# Patient Record
Sex: FEMALE | Race: BLACK OR AFRICAN AMERICAN | NOT HISPANIC OR LATINO | Employment: FULL TIME | URBAN - METROPOLITAN AREA
[De-identification: names, ages, dates, MRNs, and addresses within clinical notes are randomized per-mention and may not be internally consistent; named-entity substitution may affect disease eponyms.]

---

## 2020-11-22 ENCOUNTER — APPOINTMENT (OUTPATIENT)
Dept: ULTRASOUND IMAGING | Facility: HOSPITAL | Age: 40
End: 2020-11-22

## 2020-11-22 ENCOUNTER — HOSPITAL ENCOUNTER (EMERGENCY)
Facility: HOSPITAL | Age: 40
Discharge: HOME | End: 2020-11-22
Attending: EMERGENCY MEDICINE | Admitting: EMERGENCY MEDICINE

## 2020-11-22 VITALS
OXYGEN SATURATION: 99 % | DIASTOLIC BLOOD PRESSURE: 69 MMHG | HEIGHT: 61 IN | TEMPERATURE: 98.1 F | HEART RATE: 73 BPM | RESPIRATION RATE: 18 BRPM | SYSTOLIC BLOOD PRESSURE: 103 MMHG

## 2020-11-22 DIAGNOSIS — O20.0 THREATENED MISCARRIAGE IN EARLY PREGNANCY: Primary | ICD-10-CM

## 2020-11-22 LAB
ABO GROUP BLD: NORMAL
ALBUMIN SERPL-MCNC: 4.1 G/DL (ref 3.5–5.2)
ALBUMIN/GLOB SERPL: 0.9 G/DL
ALP SERPL-CCNC: 51 U/L (ref 39–117)
ALT SERPL W P-5'-P-CCNC: 30 U/L (ref 1–33)
ANION GAP SERPL CALCULATED.3IONS-SCNC: 9.5 MMOL/L (ref 5–15)
AST SERPL-CCNC: 22 U/L (ref 1–32)
BACTERIA UR QL AUTO: ABNORMAL /HPF
BASOPHILS # BLD AUTO: 0.04 10*3/MM3 (ref 0–0.2)
BASOPHILS NFR BLD AUTO: 0.7 % (ref 0–1.5)
BILIRUB SERPL-MCNC: 0.7 MG/DL (ref 0–1.2)
BILIRUB UR QL STRIP: NEGATIVE
BLD GP AB SCN SERPL QL: NEGATIVE
BUN SERPL-MCNC: 9 MG/DL (ref 6–20)
BUN/CREAT SERPL: 13.6 (ref 7–25)
CALCIUM SPEC-SCNC: 9.5 MG/DL (ref 8.6–10.5)
CHLORIDE SERPL-SCNC: 106 MMOL/L (ref 98–107)
CLARITY UR: CLEAR
CLUE CELLS SPEC QL WET PREP: NORMAL
CO2 SERPL-SCNC: 22.5 MMOL/L (ref 22–29)
COLOR UR: YELLOW
CREAT SERPL-MCNC: 0.66 MG/DL (ref 0.57–1)
DEPRECATED RDW RBC AUTO: 39.4 FL (ref 37–54)
EOSINOPHIL # BLD AUTO: 0.24 10*3/MM3 (ref 0–0.4)
EOSINOPHIL NFR BLD AUTO: 4 % (ref 0.3–6.2)
ERYTHROCYTE [DISTWIDTH] IN BLOOD BY AUTOMATED COUNT: 12.2 % (ref 12.3–15.4)
GFR SERPL CREATININE-BSD FRML MDRD: 120 ML/MIN/1.73
GLOBULIN UR ELPH-MCNC: 4.5 GM/DL
GLUCOSE SERPL-MCNC: 102 MG/DL (ref 65–99)
GLUCOSE UR STRIP-MCNC: NEGATIVE MG/DL
HCG INTACT+B SERPL-ACNC: 1334 MIU/ML
HCT VFR BLD AUTO: 37.4 % (ref 34–46.6)
HGB BLD-MCNC: 12.1 G/DL (ref 12–15.9)
HGB UR QL STRIP.AUTO: ABNORMAL
HOLD SPECIMEN: NORMAL
HOLD SPECIMEN: NORMAL
HYALINE CASTS UR QL AUTO: ABNORMAL /LPF
HYDATID CYST SPEC WET PREP: NORMAL
IMM GRANULOCYTES # BLD AUTO: 0.01 10*3/MM3 (ref 0–0.05)
IMM GRANULOCYTES NFR BLD AUTO: 0.2 % (ref 0–0.5)
KETONES UR QL STRIP: NEGATIVE
LEUKOCYTE ESTERASE UR QL STRIP.AUTO: NEGATIVE
LYMPHOCYTES # BLD AUTO: 2.81 10*3/MM3 (ref 0.7–3.1)
LYMPHOCYTES NFR BLD AUTO: 46.9 % (ref 19.6–45.3)
MCH RBC QN AUTO: 28.5 PG (ref 26.6–33)
MCHC RBC AUTO-ENTMCNC: 32.4 G/DL (ref 31.5–35.7)
MCV RBC AUTO: 88.2 FL (ref 79–97)
MONOCYTES # BLD AUTO: 0.59 10*3/MM3 (ref 0.1–0.9)
MONOCYTES NFR BLD AUTO: 9.8 % (ref 5–12)
NEUTROPHILS NFR BLD AUTO: 2.3 10*3/MM3 (ref 1.7–7)
NEUTROPHILS NFR BLD AUTO: 38.4 % (ref 42.7–76)
NITRITE UR QL STRIP: NEGATIVE
NRBC BLD AUTO-RTO: 0 /100 WBC (ref 0–0.2)
PH UR STRIP.AUTO: 6.5 [PH] (ref 5–8)
PLATELET # BLD AUTO: 243 10*3/MM3 (ref 140–450)
PMV BLD AUTO: 10.9 FL (ref 6–12)
POTASSIUM SERPL-SCNC: 4.3 MMOL/L (ref 3.5–5.2)
PROT SERPL-MCNC: 8.6 G/DL (ref 6–8.5)
PROT UR QL STRIP: NEGATIVE
RBC # BLD AUTO: 4.24 10*6/MM3 (ref 3.77–5.28)
RBC # UR: ABNORMAL /HPF
REF LAB TEST METHOD: ABNORMAL
RH BLD: NEGATIVE
SODIUM SERPL-SCNC: 138 MMOL/L (ref 136–145)
SP GR UR STRIP: 1.02 (ref 1–1.03)
SQUAMOUS #/AREA URNS HPF: ABNORMAL /HPF
T VAGINALIS SPEC QL WET PREP: NORMAL
T&S EXPIRATION DATE: NORMAL
UROBILINOGEN UR QL STRIP: ABNORMAL
WBC # BLD AUTO: 5.99 10*3/MM3 (ref 3.4–10.8)
WBC SPEC QL WET PREP: NORMAL
WBC UR QL AUTO: ABNORMAL /HPF
WHOLE BLOOD HOLD SPECIMEN: NORMAL
WHOLE BLOOD HOLD SPECIMEN: NORMAL
YEAST GENITAL QL WET PREP: NORMAL

## 2020-11-22 PROCEDURE — 86850 RBC ANTIBODY SCREEN: CPT | Performed by: EMERGENCY MEDICINE

## 2020-11-22 PROCEDURE — 87210 SMEAR WET MOUNT SALINE/INK: CPT | Performed by: EMERGENCY MEDICINE

## 2020-11-22 PROCEDURE — 96372 THER/PROPH/DIAG INJ SC/IM: CPT

## 2020-11-22 PROCEDURE — 81001 URINALYSIS AUTO W/SCOPE: CPT | Performed by: EMERGENCY MEDICINE

## 2020-11-22 PROCEDURE — 80053 COMPREHEN METABOLIC PANEL: CPT | Performed by: EMERGENCY MEDICINE

## 2020-11-22 PROCEDURE — 86900 BLOOD TYPING SEROLOGIC ABO: CPT | Performed by: EMERGENCY MEDICINE

## 2020-11-22 PROCEDURE — 99284 EMERGENCY DEPT VISIT MOD MDM: CPT

## 2020-11-22 PROCEDURE — 87491 CHLMYD TRACH DNA AMP PROBE: CPT | Performed by: EMERGENCY MEDICINE

## 2020-11-22 PROCEDURE — 99285 EMERGENCY DEPT VISIT HI MDM: CPT

## 2020-11-22 PROCEDURE — 84702 CHORIONIC GONADOTROPIN TEST: CPT

## 2020-11-22 PROCEDURE — 76815 OB US LIMITED FETUS(S): CPT

## 2020-11-22 PROCEDURE — 87591 N.GONORRHOEAE DNA AMP PROB: CPT | Performed by: EMERGENCY MEDICINE

## 2020-11-22 PROCEDURE — 86901 BLOOD TYPING SEROLOGIC RH(D): CPT | Performed by: EMERGENCY MEDICINE

## 2020-11-22 PROCEDURE — 76817 TRANSVAGINAL US OBSTETRIC: CPT

## 2020-11-22 PROCEDURE — 85025 COMPLETE CBC W/AUTO DIFF WBC: CPT

## 2020-11-22 PROCEDURE — P9612 CATHETERIZE FOR URINE SPEC: HCPCS

## 2020-11-22 PROCEDURE — 25010000002 RHO D IMMUNE GLOBULIN 1500 UNIT/2ML SOLUTION PREFILLED SYRINGE: Performed by: EMERGENCY MEDICINE

## 2020-11-22 RX ORDER — PNV NO.95/FERROUS FUM/FOLIC AC 28MG-0.8MG
1 TABLET ORAL DAILY
Qty: 30 TABLET | Refills: 0 | Status: SHIPPED | OUTPATIENT
Start: 2020-11-22

## 2020-11-22 RX ORDER — SODIUM CHLORIDE 0.9 % (FLUSH) 0.9 %
10 SYRINGE (ML) INJECTION AS NEEDED
Status: DISCONTINUED | OUTPATIENT
Start: 2020-11-22 | End: 2020-11-22 | Stop reason: HOSPADM

## 2020-11-22 RX ADMIN — HUMAN RHO(D) IMMUNE GLOBULIN 300 MCG: 1500 SOLUTION INTRAMUSCULAR; INTRAVENOUS at 11:28

## 2020-11-22 NOTE — ED NOTES
Pt reports she just moved here from [place] and hasn't established OB Care. Pt reports she is 14wks pregnant and started spotting about a week ago. Pt reports she passed a clot yesterday and today started bleeding through 2 pads an hour with intermittent and cramping.     Pt was wearing a mask during assessment.  This RN wore appropriate PPE       Rochelle Jones RN  11/22/20 0753

## 2020-11-22 NOTE — DISCHARGE INSTRUCTIONS
You are advised to follow closely with Dr. De Dios or gynecologist of your choice in 2-3 days for recheck, pelvic culture results, final results of lab work and imaging testing, and further testing/treatment as needed.    Drink plenty of fluids-at least 10 glasses of water daily.  Take prenatal vitamins every day.  Pelvic rest until follow with Dr. De Diso or gynecologist of your choice.        Please return to the emergency department immediately with chest pain different than usual for you, shortness of air, abdominal pain, persistent vomiting/fever, blood in emesis or stool, lightheadedness/fainting, problems with speech, one sided weakness/numbness, new incontinence, problems with vision, bleeding greater than 1 pad per hour, pain worse than bad menstrual cramping, or for worsening of symptoms or other concerns.

## 2020-11-22 NOTE — ED PROVIDER NOTES
EMERGENCY DEPARTMENT ENCOUNTER    CHIEF COMPLAINT  Chief Complaint: Vaginal bleeding  History given by: Patient  History limited by: Nothing  Room Number: 40/40  PMD: Provider, No Known  Patient reports she has an appointment with a gynecologist 12/29/2020 but is unable to state who that appointment is with    HPI:  Pt is a 40 y.o. female who reports her LNMP was 8/15/2020 and that she has taken a positive home pregnancy test presents complaining of vaginal bleeding for 1 week.  Patient reports she is G4, P3 Ab0 at 14-1/7 by her LNMP with vaginal bleeding with mild suprapubic cramping similar to mild menstrual cramps without fever, nausea/vomiting, vaginal discharge, dysuria, chest pain, shortness of air, cough.  Patient reports she is blood 4 pads in the past 24 hours.  Patient reports she is non-smoker denies significant medical history or complications with her previous pregnancies    Duration: 1 week  Associated Symptoms: Mild suprapubic cramping  Aggravating Factors: Nothing  Alleviating Factors: Nothing  Treatment before arrival: None    PAST MEDICAL HISTORY  Active Ambulatory Problems     Diagnosis Date Noted   • No Active Ambulatory Problems     Resolved Ambulatory Problems     Diagnosis Date Noted   • No Resolved Ambulatory Problems     No Additional Past Medical History       PAST SURGICAL HISTORY  History reviewed. No pertinent surgical history.    FAMILY HISTORY  History reviewed. No pertinent family history.    SOCIAL HISTORY  Social History     Socioeconomic History   • Marital status:      Spouse name: Not on file   • Number of children: Not on file   • Years of education: Not on file   • Highest education level: Not on file   Tobacco Use   • Smoking status: Never Smoker   Substance and Sexual Activity   • Alcohol use: Not Currently   • Drug use: Never   • Sexual activity: Defer       ALLERGIES  Patient has no known allergies.    REVIEW OF SYSTEMS  Review of Systems   Constitutional: Negative  for chills and fever.   HENT: Negative for rhinorrhea and sore throat.    Eyes: Negative for visual disturbance.   Respiratory: Negative for cough and shortness of breath.    Cardiovascular: Negative for chest pain, palpitations and leg swelling.   Gastrointestinal: Negative for abdominal pain, diarrhea and vomiting.   Endocrine: Negative.    Genitourinary: Positive for pelvic pain ( Mild suprapubic cramping similar to mild menstrual cramps) and vaginal bleeding ( 1 week). Negative for decreased urine volume, dysuria, frequency, vaginal discharge and vaginal pain.   Musculoskeletal: Negative for neck pain.   Skin: Negative for rash.   Neurological: Negative for syncope and headaches.   Psychiatric/Behavioral: Negative.    All other systems reviewed and are negative.      PHYSICAL EXAM  ED Triage Vitals   Temp Heart Rate Resp BP SpO2   11/22/20 0730 11/22/20 0730 11/22/20 0730 11/22/20 0741 11/22/20 0730   98.1 °F (36.7 °C) 92 18 114/83 99 %      Temp src Heart Rate Source Patient Position BP Location FiO2 (%)   11/22/20 0730 -- -- -- --   Tympanic           Physical Exam   Constitutional: She is oriented to person, place, and time.  Non-toxic appearance. She appears distressed (mild).   HENT:   Head: Normocephalic and atraumatic.   Eyes: EOM are normal.   Neck: Normal range of motion. Neck supple.   Cardiovascular: Normal rate, regular rhythm, normal heart sounds and intact distal pulses.   No murmur heard.  Pulses:       Posterior tibial pulses are 2+ on the right side and 2+ on the left side.   Pulmonary/Chest: Effort normal and breath sounds normal. No respiratory distress.   Abdominal: Soft. Bowel sounds are normal. There is no abdominal tenderness. There is no rebound and no guarding.   Genitourinary:    Vulva and cervix normal.   Uterus is enlarged. Cervix exhibits no motion tenderness and no tenderness. Right adnexum displays no mass and no tenderness. Left adnexum displays no mass and no tenderness.     Genitourinary Comments: Uterus 12 to 15 cm  Cervix closed  Small amount of dark blood in the vaginal vault without clots     Musculoskeletal: Normal range of motion.         General: No edema.   Neurological: She is alert and oriented to person, place, and time.   Skin: Skin is warm and dry.   Psychiatric: Affect normal.   Nursing note and vitals reviewed.      LAB RESULTS  Lab Results (last 24 hours)     Procedure Component Value Units Date/Time    CBC & Differential [[id number]]  (Abnormal) Collected: 11/22/20 0801    Specimen: Blood Updated: 11/22/20 0811    Narrative:      The following orders were created for panel order CBC & Differential.  Procedure                               Abnormality         Status                     ---------                               -----------         ------                     CBC Auto Differential[[id number]]        Abnormal            Final result                 Please view results for these tests on the individual orders.    hCG, Quantitative, Pregnancy [[id number]] Collected: 11/22/20 0801    Specimen: Blood Updated: 11/22/20 0832     HCG Quantitative 1,334.00 mIU/mL     Narrative:      HCG Ranges by Gestational Age    Females - non-pregnant premenopausal   </= 1mIU/mL HCG  Females - postmenopausal               </= 7mIU/mL HCG    3 Weeks         5.4 -      72 mIU/mL  4 Weeks        10.2 -     708 mIU/mL  5 Weeks       217   -   8,245 mIU/mL  6 Weeks       152   -  32,177 mIU/mL  7 Weeks     4,059   - 153,767 mIU/mL  8 Weeks    31,366   - 149,094 mIU/mL  9 Weeks    59,109   - 135,901 mIU/mL  10 Weeks   44,186   - 170,409 mIU/mL  12 Weeks   27,107   - 201,615 mIU/mL  14 Weeks   24,302   -  93,646 mIU/mL  15 Weeks   12,540   -  69,747 mIU/mL  16 Weeks    8,904   -  55,332 mIU/mL  17 Weeks    8,240   -  51,793 mIU/mL  18 Weeks    9,649   -  55,271 mIU/mL    Results may be falsely decreased if patient taking Biotin.      CBC Auto Differential [[id number]]  (Abnormal) Collected:  11/22/20 0801    Specimen: Blood Updated: 11/22/20 0811     WBC 5.99 10*3/mm3      RBC 4.24 10*6/mm3      Hemoglobin 12.1 g/dL      Hematocrit 37.4 %      MCV 88.2 fL      MCH 28.5 pg      MCHC 32.4 g/dL      RDW 12.2 %      RDW-SD 39.4 fl      MPV 10.9 fL      Platelets 243 10*3/mm3      Neutrophil % 38.4 %      Lymphocyte % 46.9 %      Monocyte % 9.8 %      Eosinophil % 4.0 %      Basophil % 0.7 %      Immature Grans % 0.2 %      Neutrophils, Absolute 2.30 10*3/mm3      Lymphocytes, Absolute 2.81 10*3/mm3      Monocytes, Absolute 0.59 10*3/mm3      Eosinophils, Absolute 0.24 10*3/mm3      Basophils, Absolute 0.04 10*3/mm3      Immature Grans, Absolute 0.01 10*3/mm3      nRBC 0.0 /100 WBC     Comprehensive Metabolic Panel [[id number]]  (Abnormal) Collected: 11/22/20 0801    Specimen: Blood Updated: 11/22/20 0833     Glucose 102 mg/dL      BUN 9 mg/dL      Creatinine 0.66 mg/dL      Sodium 138 mmol/L      Potassium 4.3 mmol/L      Comment: Slight hemolysis detected by analyzer. Results may be affected.        Chloride 106 mmol/L      CO2 22.5 mmol/L      Calcium 9.5 mg/dL      Total Protein 8.6 g/dL      Albumin 4.10 g/dL      ALT (SGPT) 30 U/L      AST (SGOT) 22 U/L      Comment: Slight hemolysis detected by analyzer. Results may be affected.        Alkaline Phosphatase 51 U/L      Total Bilirubin 0.7 mg/dL      eGFR  African Amer 120 mL/min/1.73      Globulin 4.5 gm/dL      A/G Ratio 0.9 g/dL      BUN/Creatinine Ratio 13.6     Anion Gap 9.5 mmol/L     Narrative:      GFR Normal >60  Chronic Kidney Disease <60  Kidney Failure <15      Urinalysis With Microscopic If Indicated (No Culture) - Urine, Catheter [[id number]]  (Abnormal) Collected: 11/22/20 0822    Specimen: Urine, Catheter Updated: 11/22/20 0847     Color, UA Yellow     Appearance, UA Clear     pH, UA 6.5     Specific Gravity, UA 1.018     Glucose, UA Negative     Ketones, UA Negative     Bilirubin, UA Negative     Blood, UA Trace     Protein, UA Negative      Leuk Esterase, UA Negative     Nitrite, UA Negative     Urobilinogen, UA 0.2 E.U./dL    Chlamydia trachomatis, Neisseria gonorrhoeae, PCR - Swab, Cervix [[id number]] Collected: 11/22/20 0822    Specimen: Swab from Cervix Updated: 11/22/20 0831    Wet Prep, Genital - Swab, Vagina [[id number]]  (Normal) Collected: 11/22/20 0822    Specimen: Swab from Vagina Updated: 11/22/20 0846     YEAST No yeast seen     HYPHAL ELEMENTS No Hyphal elements seen     WBC'S No WBC's seen     Clue Cells, Wet Prep No Clue cells seen     Trichomonas, Wet Prep No Trichomonas seen    Urinalysis, Microscopic Only - Urine, Catheter [[id number]]  (Abnormal) Collected: 11/22/20 0822    Specimen: Urine, Catheter Updated: 11/22/20 0849     RBC, UA 3-5 /HPF      WBC, UA 0-2 /HPF      Bacteria, UA None Seen /HPF      Squamous Epithelial Cells, UA 0-2 /HPF      Hyaline Casts, UA 3-6 /LPF      Methodology Automated Microscopy          I ordered the above labs and reviewed the results    RADIOLOGY  US Ob Limited 1 + Fetuses   Final Result       Findings suspicious for fetal demise, as described, close follow-up   recommended.       Discussed by telephone with Dr. Aguilera at time of interpretation, 0959,   11/22/2020.               This report was finalized on 11/22/2020 10:01 AM by Dr. Doug Pinzon M.D.          US Ob Transvaginal    (Results Pending)      Ultrasound with gestational sac size mismatch with size of fetal pole that dates to 7-4/7 by measurements without signs of cardiac activity.  Ovaries unremarkable no free fluid in the pelvis      I ordered the above noted radiological studies. Interpreted by radiologist. Viewed by me in PACS.       PROCEDURES  Procedures      PROGRESS AND CONSULTS  ED Course as of Nov 22 1731   Sun Nov 22, 2020   1108 Discussed with Dr. Pinzon, radiology who reports mismatch and gestational sac versus fetal pole without cardiac activity highly suspicious for nonviable pregnancy    [TO]      ED Course User  Index  [TO] Verónica Aguilera MD       Patient voices understanding of concern for nonviable pregnancy and suspected inevitable miscarriage with recommendations for close follow-up with gynecologist for recheck, further testing and treatment as needed.  She voices understanding of need to return to the emergency department with bleeding greater than 1 pad per hour, pain greater than bad menstrual cramps, fever, vomiting, lightheadedness or fainting or other concerns.    MEDICAL DECISION MAKING  Results were reviewed/discussed with the patient and they were also made aware of online access. Pt also made aware that some labs, such as cultures, will not be resulted during ER visit and follow up with PMD is necessary.       MDM       DIAGNOSIS  Final diagnoses:   Threatened miscarriage in early pregnancy       DISPOSITION  DISCHARGE    Patient discharged in stable condition.    Reviewed implications of results, diagnosis, meds, responsibility to follow up, warning signs and symptoms of possible worsening, potential complications and reasons to return to ER.    Patient/Family voiced understanding of above instructions.    Discussed plan for discharge, as there is no emergent indication for admission. Patient referred to primary care provider for BP management due to today's BP. Pt/family is agreeable and understands need for follow up and repeat testing.  Pt is aware that discharge does not mean that nothing is wrong but it indicates no emergency is present that requires admission and they must continue care with follow-up as given below or physician of their choice.     FOLLOW-UP  Karlee De Dios MD  [address] [facility] [id number] [phone]    Schedule an appointment as soon as possible for a visit in 3 days  EVEN IF WELL         Medication List      New Prescriptions    prenatal vitamin 28-0.8 28-0.8 MG tablet tablet  Take 1 tablet by mouth Daily.           Where to Get Your Medications      You can get  these medications from any pharmacy    Bring a paper prescription for each of these medications  · prenatal vitamin 28-0.8 28-0.8 MG tablet tablet           Latest Documented Vital Signs:  As of 17:31 EST  BP- 103/69 HR- 73 Temp- 98.1 °F (36.7 °C) (Tympanic) O2 sat- 99%    --  Patient was wearing facemask when I entered the room and throughout our encounter. Full protective equipment was worn throughout this patient encounter including a face mask, eye protection and gloves. Hand hygiene was performed before donning protective equipment and after removal when leaving the room.      Verónica Aguilera MD  11/22/20 7164

## 2020-11-25 ENCOUNTER — TELEPHONE (OUTPATIENT)
Dept: SOCIAL WORK | Facility: HOSPITAL | Age: 40
End: 2020-11-25

## 2020-11-25 LAB
C TRACH RRNA SPEC QL NAA+PROBE: NEGATIVE
N GONORRHOEA RRNA SPEC QL NAA+PROBE: NEGATIVE

## 2020-11-25 NOTE — TELEPHONE ENCOUNTER
ER F/U phone call: Pt stated that she f/u with the gyn that was recommended. She is doing better today, due to the circumstances. No further questions or concerns voiced at this time. Shandra Fry RN

## 2020-12-02 ENCOUNTER — TELEPHONE (OUTPATIENT)
Dept: OBSTETRICS AND GYNECOLOGY | Age: 40
End: 2020-12-02

## 2020-12-02 NOTE — TELEPHONE ENCOUNTER
I am ccing Dr De Dios on this email for her input as well.  I'm happy to have pt seen tomorrow with u/s to confirm her suspicions. I have reviewed hospital notes as well.  Pt is rh negative and I do see that Rhogam was administered.  Defer to Dr De Dios recommendation as well

## 2020-12-02 NOTE — TELEPHONE ENCOUNTER
Pt called was seen in ER on 11/22/2020 for bleeding, treated for miscarriage.  Pt feels as if she has passed the pregnancy at home.  Symptoms have started to ease.  Please advise on scheduling?  Pt was instructed to see Dr. De Dios.

## 2020-12-03 ENCOUNTER — OFFICE VISIT (OUTPATIENT)
Dept: OBSTETRICS AND GYNECOLOGY | Age: 40
End: 2020-12-03

## 2020-12-03 ENCOUNTER — PROCEDURE VISIT (OUTPATIENT)
Dept: OBSTETRICS AND GYNECOLOGY | Age: 40
End: 2020-12-03

## 2020-12-03 VITALS
SYSTOLIC BLOOD PRESSURE: 122 MMHG | BODY MASS INDEX: 28.13 KG/M2 | WEIGHT: 149 LBS | HEIGHT: 61 IN | DIASTOLIC BLOOD PRESSURE: 72 MMHG

## 2020-12-03 DIAGNOSIS — O02.1 MISSED ABORTION: Primary | ICD-10-CM

## 2020-12-03 DIAGNOSIS — O03.4 INCOMPLETE ABORTION: ICD-10-CM

## 2020-12-03 DIAGNOSIS — Z13.89 SCREENING FOR BLOOD OR PROTEIN IN URINE: Primary | ICD-10-CM

## 2020-12-03 DIAGNOSIS — O20.0 THREATENED ABORTION: ICD-10-CM

## 2020-12-03 LAB
B-HCG UR QL: POSITIVE
BILIRUB BLD-MCNC: ABNORMAL MG/DL
CLARITY, POC: CLEAR
COLOR UR: YELLOW
GLUCOSE UR STRIP-MCNC: NEGATIVE MG/DL
INTERNAL NEGATIVE CONTROL: NEGATIVE
INTERNAL POSITIVE CONTROL: POSITIVE
KETONES UR QL: NEGATIVE
LEUKOCYTE EST, POC: ABNORMAL
Lab: ABNORMAL
NITRITE UR-MCNC: NEGATIVE MG/ML
PH UR: 7 [PH] (ref 5–8)
PROT UR STRIP-MCNC: ABNORMAL MG/DL
RBC # UR STRIP: ABNORMAL /UL
SP GR UR: 1.02 (ref 1–1.03)
UROBILINOGEN UR QL: ABNORMAL

## 2020-12-03 PROCEDURE — 99203 OFFICE O/P NEW LOW 30 MIN: CPT | Performed by: PHYSICIAN ASSISTANT

## 2020-12-03 PROCEDURE — 76817 TRANSVAGINAL US OBSTETRIC: CPT | Performed by: OBSTETRICS & GYNECOLOGY

## 2020-12-03 PROCEDURE — 81025 URINE PREGNANCY TEST: CPT | Performed by: PHYSICIAN ASSISTANT

## 2020-12-03 NOTE — TELEPHONE ENCOUNTER
Pt will have to be worked in today with PA/NP and she needs to have an ultrasound as well.  Make the appt with an u/s and double book us if need be.  Just make sure she understands there will be a wait.

## 2020-12-03 NOTE — TELEPHONE ENCOUNTER
Reviewed chart. Pt evaluated in ER on 11/22; email reviewed by staff on 12/2/2020. Please schedule f/u visit with NP or PA on 12/3/2020 as MD out of office post-call

## 2020-12-03 NOTE — PROGRESS NOTES
"Subjective     Chief Complaint   Patient presents with   • Gynecologic Exam     follow up from ER miscarriage       Cheyanne Hill is a 40 y.o.  whose LMP is Patient's last menstrual period was 08/15/2020. presents for f/u from ER    Pt is new to our office  Was seen in ER recently for bleeding in early pregnancy  Was supposed to be approx 14 wks along but u/s suggested fetal demise given that no heart beat was noted   She was having heavy bleeding at that time  Advised to f/u with our office  Pt thought she miscarried and did not contact the office until yesterday suspecting she had a complete miscarriage  Of note, she is rh negative and was given rhogam at the hospital    Pt has had 3 other pregnancies. She has 21 yo children and a 3 yo  No issues with previous pregnancy  Denies any significant med history    From [place], in [place] for a year    Will be establishing with Dr De Dios    No Additional Complaints Reported    The following portions of the patient's history were reviewed and updated as appropriate:vital signs, allergies, current medications, past family history, past medical history, past social history, past surgical history and problem list      Review of Systems   Genitourinary:positive for early stage of pregnancy     Objective      /72   Ht 154.9 cm (61\")   Wt 67.6 kg (149 lb)   LMP 08/15/2020   Breastfeeding No   BMI 28.15 kg/m²     Physical Exam    General:   alert, comfortable and moderate distress   Heart: Not performed today   Lungs: Not performed today.   Breast: Not performed today   Neck: na   Abdomen: {Not performed today   CVA: Not performed today   Pelvis: Not performed today   Extremities: Not performed today   Neurologic: negative   Psychiatric: Normal affect, judgement, and mood       Lab Review   Labs: CBC     Imaging   Ultrasound - Pelvic Vaginal CRL measures 1.27 cm c/w 7 wks 3 days, not c/w dates and no FHT noted     Assessment/Plan     ASSESSMENT  1. Screening " for blood or protein in urine    2. Threatened     3. Incomplete         PLAN  1.   Orders Placed This Encounter   Procedures   • POC Pregnancy, Urine   • POC Urinalysis Dipstick, Multipro   • CBC & Differential       2. CBC ordered today. Will plan f/u to discuss POC with Dr De Dios. She has been scheduled at 12 o'clock tomorrow    Follow up: 1 day(s)    ALICE Cool  12/3/2020

## 2020-12-04 LAB
BASOPHILS # BLD AUTO: 0.03 10*3/MM3 (ref 0–0.2)
BASOPHILS NFR BLD AUTO: 0.4 % (ref 0–1.5)
EOSINOPHIL # BLD AUTO: 0.18 10*3/MM3 (ref 0–0.4)
EOSINOPHIL NFR BLD AUTO: 2.6 % (ref 0.3–6.2)
ERYTHROCYTE [DISTWIDTH] IN BLOOD BY AUTOMATED COUNT: 12.3 % (ref 12.3–15.4)
HCT VFR BLD AUTO: 36.8 % (ref 34–46.6)
HGB BLD-MCNC: 11.8 G/DL (ref 12–15.9)
IMM GRANULOCYTES # BLD AUTO: 0.03 10*3/MM3 (ref 0–0.05)
IMM GRANULOCYTES NFR BLD AUTO: 0.4 % (ref 0–0.5)
LYMPHOCYTES # BLD AUTO: 2.91 10*3/MM3 (ref 0.7–3.1)
LYMPHOCYTES NFR BLD AUTO: 42.2 % (ref 19.6–45.3)
MCH RBC QN AUTO: 28.4 PG (ref 26.6–33)
MCHC RBC AUTO-ENTMCNC: 32.1 G/DL (ref 31.5–35.7)
MCV RBC AUTO: 88.7 FL (ref 79–97)
MONOCYTES # BLD AUTO: 0.66 10*3/MM3 (ref 0.1–0.9)
MONOCYTES NFR BLD AUTO: 9.6 % (ref 5–12)
NEUTROPHILS # BLD AUTO: 3.09 10*3/MM3 (ref 1.7–7)
NEUTROPHILS NFR BLD AUTO: 44.8 % (ref 42.7–76)
NRBC BLD AUTO-RTO: 0 /100 WBC (ref 0–0.2)
PLATELET # BLD AUTO: 256 10*3/MM3 (ref 140–450)
RBC # BLD AUTO: 4.15 10*6/MM3 (ref 3.77–5.28)
WBC # BLD AUTO: 6.9 10*3/MM3 (ref 3.4–10.8)

## 2020-12-07 NOTE — PROGRESS NOTES
I have reviewed the notes, assessments, and/or procedures performed by ALICE Ramos, I concur with her/his documentation of Cheyanne Hill.

## 2020-12-08 PROBLEM — O26.899 RH NEGATIVE, ANTEPARTUM: Status: ACTIVE | Noted: 2020-12-08

## 2020-12-08 PROBLEM — Z67.91 RH NEGATIVE, ANTEPARTUM: Status: ACTIVE | Noted: 2020-12-08

## 2020-12-08 NOTE — PROGRESS NOTES
"Chief complaint:LON CHAPIN  Cheyanne Hill is a 40 y.o. female presents for evaluation of missed . She reports that over the weekend she had progression to heavy bleeding and passed a large sac of tissue. It was the size of a jeremias. She notes the bleeding then diminished. She denies fever/chills or current severe pain.         The following portions of the patient's history were reviewed and updated as appropriate: allergies, current medications, past family history, past medical history, past social history, past surgical history and problem list.    Review of Systems  Constitutional: negative for chills and fevers  Respiratory: negative for cough and shortness or air  Cardiovascular: negative for chest pain  Gastrointestinal: negative for abdominal pain, nausea and vomiting  Genitourinary:positive for light vaginal bleeding, negative for pelvic pain  Hematologic/lymphatic: negative for easy bruising  Neurological: negative for dizziness and headaches    /80   Ht 154.9 cm (61\")   Wt 69 kg (152 lb 3.2 oz)   LMP 08/15/2020   Breastfeeding No   BMI 28.76 kg/m²         Physical Exam  Constitutional:       Appearance: Normal appearance.   Cardiovascular:      Rate and Rhythm: Normal rate and regular rhythm.   Abdominal:      General: Abdomen is flat. There is no distension.      Palpations: Abdomen is soft.      Tenderness: There is no abdominal tenderness.   Genitourinary:     Comments: Normal external female genitalia, vagina with scant brown discharge. Cervix closed and without bleeding. No CMT or uterine tenderness.  Skin:     General: Skin is warm.   Neurological:      General: No focal deficit present.      Mental Status: She is alert and oriented to person, place, and time.   Psychiatric:         Mood and Affect: Mood normal.         Behavior: Behavior normal.     tv u/s: uterus no longer contains gestational sac or fetal pole. Endometrium measures 5 to 22 mm and without obvious retained tissue. " Normal ovaries noted.         Diagnoses and all orders for this visit:    1. Rh negative, antepartum (Primary)    2. SAB (spontaneous )  -     HCG, B-subunit, Quantitative ([facility] Only)    Other orders  -     Rhogam Immune Globulin Immunization      Clinical course suggests completed SAB. Prominent endometrium noted but previously noted gestational sac is gone and there is no ongoing active bleeding. Recommend hcg today and follow to 0. Advised pelvic rest until f/u visit in 3 weeks. Advised pt to call for recurrent heavy bleeding or pelvic pain.     Pt is Rh negative and received rhogam 3 weeks ago in ER. Will repeat today to assure covered for prolonged bleeding/SAB and pt agrees.     Pt is interested in contraceptive options and needs to establish gyn care. Provided information and recommend she schedule annual in 3 weeks.

## 2020-12-09 ENCOUNTER — PROCEDURE VISIT (OUTPATIENT)
Dept: OBSTETRICS AND GYNECOLOGY | Age: 40
End: 2020-12-09

## 2020-12-09 ENCOUNTER — OFFICE VISIT (OUTPATIENT)
Dept: OBSTETRICS AND GYNECOLOGY | Age: 40
End: 2020-12-09

## 2020-12-09 VITALS
SYSTOLIC BLOOD PRESSURE: 130 MMHG | WEIGHT: 152.2 LBS | DIASTOLIC BLOOD PRESSURE: 80 MMHG | HEIGHT: 61 IN | BODY MASS INDEX: 28.74 KG/M2

## 2020-12-09 DIAGNOSIS — O03.9 SAB (SPONTANEOUS ABORTION): ICD-10-CM

## 2020-12-09 DIAGNOSIS — O20.0 THREATENED ABORTION: Primary | ICD-10-CM

## 2020-12-09 DIAGNOSIS — O26.899 RH NEGATIVE, ANTEPARTUM: Primary | ICD-10-CM

## 2020-12-09 DIAGNOSIS — Z67.91 RH NEGATIVE, ANTEPARTUM: Primary | ICD-10-CM

## 2020-12-09 LAB — HCG INTACT+B SERPL-ACNC: 20.83 MIU/ML

## 2020-12-09 PROCEDURE — 96372 THER/PROPH/DIAG INJ SC/IM: CPT | Performed by: OBSTETRICS & GYNECOLOGY

## 2020-12-09 PROCEDURE — 99213 OFFICE O/P EST LOW 20 MIN: CPT | Performed by: OBSTETRICS & GYNECOLOGY

## 2020-12-09 PROCEDURE — 76817 TRANSVAGINAL US OBSTETRIC: CPT | Performed by: OBSTETRICS & GYNECOLOGY

## 2020-12-10 ENCOUNTER — TELEPHONE (OUTPATIENT)
Dept: OBSTETRICS AND GYNECOLOGY | Age: 40
End: 2020-12-10

## 2020-12-10 NOTE — TELEPHONE ENCOUNTER
----- Message from Karlee De Dios MD sent at 12/9/2020  6:59 PM EST -----  Please call Cheyanne, her HCG has fallen to 20. This supports completed SAB. Please confirm she made f/u appt for annual.

## 2020-12-29 ENCOUNTER — OFFICE VISIT (OUTPATIENT)
Dept: OBSTETRICS AND GYNECOLOGY | Age: 40
End: 2020-12-29

## 2020-12-29 VITALS
DIASTOLIC BLOOD PRESSURE: 62 MMHG | WEIGHT: 153 LBS | HEIGHT: 61 IN | BODY MASS INDEX: 28.89 KG/M2 | SYSTOLIC BLOOD PRESSURE: 100 MMHG

## 2020-12-29 DIAGNOSIS — Z12.31 ENCOUNTER FOR SCREENING MAMMOGRAM FOR MALIGNANT NEOPLASM OF BREAST: ICD-10-CM

## 2020-12-29 DIAGNOSIS — Z11.51 ENCOUNTER FOR SCREENING FOR HUMAN PAPILLOMAVIRUS (HPV): ICD-10-CM

## 2020-12-29 DIAGNOSIS — O03.9 SAB (SPONTANEOUS ABORTION): ICD-10-CM

## 2020-12-29 DIAGNOSIS — Z01.419 ENCOUNTER FOR GYNECOLOGICAL EXAMINATION: Primary | ICD-10-CM

## 2020-12-29 PROCEDURE — 99396 PREV VISIT EST AGE 40-64: CPT | Performed by: OBSTETRICS & GYNECOLOGY

## 2020-12-29 NOTE — PROGRESS NOTES
".  Subjective     Chief Complaint   Patient presents with   • Gynecologic Exam     AE       History of Present Illness    Cheyanne Hill is a 40 y.o.  who presents for annual exam.  She reports light bleeding following SAB until about 2 weeks ago and no bleeding since. She reports she has abstained from intercourse since SAB.   She reports she had regular menses prior to recent pregnancy. She and partner were not using contraception but do not desires another pregnancy.     Obstetric History:  OB History        5    Para   3    Term   3            AB   1    Living   3       SAB   1    TAB        Ectopic        Molar        Multiple        Live Births   3               Menstrual History:     Patient's last menstrual period was 08/15/2020 (exact date).         Current contraception: none  History of abnormal Pap smear: no  Received Gardasil immunization: no  Perform regular self breast exam: no  Family history of uterine or ovarian cancer: no  Family History of colon cancer: no  Family history of breast cancer: no    Mammogram: ordered.  Colonoscopy: not indicated.  DEXA: not indicated.    Exercise: moderately active  Calcium/Vitamin D: adequate intake    The following portions of the patient's history were reviewed and updated as appropriate: allergies, current medications, past family history, past medical history, past social history, past surgical history and problem list.    Review of Systems    Review of Systems   Constitutional: Negative for fatigue. Negative for fevers  Respiratory: Negative for shortness of breath.    Gastrointestinal: Negative for abdominal pain. Negative for change in bowel habits  Genitourinary: Negative for dysuria. Negative for current bleeding  Neurological: Negative for headaches.   Psychiatric/Behavioral: Negative for dysphoric mood.         Objective   Physical Exam    /62   Ht 154.9 cm (61\")   Wt 69.4 kg (153 lb)   LMP 08/15/2020 (Exact Date)   " Breastfeeding No   BMI 28.91 kg/m²   General:   alert and no distress   Heart: regular rate and rhythm   Lungs: clear to auscultation bilaterally   Breast: Inspection negative; no masses, retractions, nipple discharge or axillary adenopathy   Neck: Supple, no thyromegaly   Abdomen: soft, non-tender. Bowel sounds normal. No masses,  no organomegaly   Pelvis: External genitalia: normal general appearance  Urinary system: urethral meatus normal  Vaginal: normal mucosa without prolapse or lesions  Cervix: normal appearance  Adnexa: no masses or tenderness  Uterus: normal, nontender   Extremities: Extremities normal, atraumatic, no cyanosis or edema   Neurologic: Alert and oriented   Psychiatric: Normal affect, judgement, and mood     Assessment/Plan   Diagnoses and all orders for this visit:    1. Encounter for gynecological examination (Primary)  -     IGP, Apt HPV,rfx 16 / 18,45    2. Encounter for screening for human papillomavirus (HPV)  -     IGP, Apt HPV,rfx 16 / 18,45    3. Encounter for screening mammogram for malignant neoplasm of breast  -     Mammo Screening Digital Tomosynthesis Bilateral With CAD; Future    4. SAB (spontaneous )  -     HCG, B-subunit, Quantitative ([facility] Only)        All questions answered.  Breast self exam technique reviewed and patient encouraged to perform self-exam monthly.  Discussed healthy lifestyle modifications.  Recommended 30 minutes of aerobic exercise five times per week.    Advised pt to call if she does not receive results of pap within 2 weeks. Recommend mammogram and orders placed. Encouraged pt to book asap.     Pt unsure regarding desired contraception. She had mirena in past and interested in another IUD. She and spouse are considering permanent options. He may do vas and gave some names to pt of urologists in area. She may proceed with IUD in meantime. She is interested in longest acting agent. Discussed paragard option and gave information. Discussed  difference in menstrual flow with paragard vs progesterone IUD. Gave information on Kyleena, Mirena and paragard. Recommended pt call back to prior auth device once she has decided. Reviewed that placement can be scheduled during menses once device authorized. Pt notes understanding.     Repeated HCG to assure fall to < 5 as pt s/p SAB; she notes bleeding resolved completely a couple of weeks ago.

## 2020-12-30 ENCOUNTER — TELEPHONE (OUTPATIENT)
Dept: OBSTETRICS AND GYNECOLOGY | Age: 40
End: 2020-12-30

## 2020-12-30 NOTE — TELEPHONE ENCOUNTER
Called pt to review prior visit. HCG ordered but labs not available. Called to check whether pt had labs drawn. No answer on phone.

## 2020-12-31 LAB
CYTOLOGIST CVX/VAG CYTO: NORMAL
CYTOLOGY CVX/VAG DOC CYTO: NORMAL
CYTOLOGY CVX/VAG DOC THIN PREP: NORMAL
DX ICD CODE: NORMAL
HIV 1 & 2 AB SER-IMP: NORMAL
HPV I/H RISK 4 DNA CVX QL PROBE+SIG AMP: NEGATIVE
OTHER STN SPEC: NORMAL
STAT OF ADQ CVX/VAG CYTO-IMP: NORMAL

## 2021-01-04 ENCOUNTER — TELEPHONE (OUTPATIENT)
Dept: OBSTETRICS AND GYNECOLOGY | Age: 41
End: 2021-01-04

## 2021-01-04 NOTE — TELEPHONE ENCOUNTER
----- Message from Karlee De Dios MD sent at 1/3/2021  9:48 AM EST -----  Please call Cheyanne, her pap and hpv are negative/normal. She did not have her HCG drawn as ordered after last visit. Would recommend she return for this. Please also confirm she has scheduled her mammogram. This was ordered at last visit. Also confirm she decided on desired IUD type. If so, please refer her to staff to prior auth and schedule.

## 2021-01-04 NOTE — TELEPHONE ENCOUNTER
Pt notified, and she voices understanding.  She says she will come in for HCG labs and sign paperwork for IUD

## 2024-08-06 NOTE — ED TRIAGE NOTES
Pt LMP 8/15/2020, puts her at 14w1d pregnant states having vaginal bleeding started last week increased today 2 pads an hour, intermittent abd. Cramping. Pt has not seen OB yet states she just moved to [place] can not get into OB until December. Patient masked in first look triage. I was wearing mask and goggles.      Pt last seen: 7/2/24  Pt due for FU: no FU's on file  Med review: 8/6/24  PDMP review: 8/6/24